# Patient Record
Sex: MALE | Race: WHITE | NOT HISPANIC OR LATINO | URBAN - METROPOLITAN AREA
[De-identification: names, ages, dates, MRNs, and addresses within clinical notes are randomized per-mention and may not be internally consistent; named-entity substitution may affect disease eponyms.]

---

## 2017-04-13 ENCOUNTER — EMERGENCY (EMERGENCY)
Facility: HOSPITAL | Age: 62
LOS: 1 days | End: 2017-04-13
Payer: COMMERCIAL

## 2017-04-13 PROCEDURE — 71020: CPT | Mod: 26

## 2017-04-13 PROCEDURE — 99285 EMERGENCY DEPT VISIT HI MDM: CPT

## 2017-04-14 ENCOUNTER — EMERGENCY (EMERGENCY)
Facility: HOSPITAL | Age: 62
LOS: 1 days | End: 2017-04-14
Payer: COMMERCIAL

## 2017-04-14 PROCEDURE — 71020: CPT | Mod: 26

## 2017-04-14 PROCEDURE — 99284 EMERGENCY DEPT VISIT MOD MDM: CPT

## 2019-09-03 ENCOUNTER — OUTPATIENT (OUTPATIENT)
Dept: OUTPATIENT SERVICES | Facility: HOSPITAL | Age: 64
LOS: 1 days | End: 2019-09-03

## 2020-03-12 ENCOUNTER — NON-APPOINTMENT (OUTPATIENT)
Age: 65
End: 2020-03-12

## 2020-03-12 ENCOUNTER — APPOINTMENT (OUTPATIENT)
Dept: CARDIOLOGY | Facility: CLINIC | Age: 65
End: 2020-03-12
Payer: COMMERCIAL

## 2020-03-12 VITALS
SYSTOLIC BLOOD PRESSURE: 120 MMHG | HEART RATE: 75 BPM | DIASTOLIC BLOOD PRESSURE: 78 MMHG | WEIGHT: 216 LBS | OXYGEN SATURATION: 98 % | HEIGHT: 71 IN | BODY MASS INDEX: 30.24 KG/M2

## 2020-03-12 DIAGNOSIS — Z82.49 FAMILY HISTORY OF ISCHEMIC HEART DISEASE AND OTHER DISEASES OF THE CIRCULATORY SYSTEM: ICD-10-CM

## 2020-03-12 DIAGNOSIS — Z78.9 OTHER SPECIFIED HEALTH STATUS: ICD-10-CM

## 2020-03-12 DIAGNOSIS — Z86.69 PERSONAL HISTORY OF OTHER DISEASES OF THE NERVOUS SYSTEM AND SENSE ORGANS: ICD-10-CM

## 2020-03-12 DIAGNOSIS — Z87.19 PERSONAL HISTORY OF OTHER DISEASES OF THE DIGESTIVE SYSTEM: ICD-10-CM

## 2020-03-12 PROCEDURE — 93000 ELECTROCARDIOGRAM COMPLETE: CPT

## 2020-03-12 PROCEDURE — 99203 OFFICE O/P NEW LOW 30 MIN: CPT

## 2020-03-12 NOTE — ASSESSMENT
[FreeTextEntry1] : Above EKG and blood work have been reviewed. \par Recommend echocardiogram, carotid Doppler, abdominal ultrasound and nuclear stress test.  \par Continue aspirin therapy.  \par Continue beta-blocker therapy.  \par Likely requires augmentation of lipid-lowering therapy.\par Clinical follow-up will be scheduled after noninvasive testing. \par \par

## 2020-03-12 NOTE — HISTORY OF PRESENT ILLNESS
[FreeTextEntry1] : TRISTA CONTEH  is a 64 year old  M\par Primary physician Dr. Basurto.  \par \par Longstanding history of hypertension and hyperlipidemia.  Family history of cardiovascular disease.  \par Multiple family members with cardiovascular disease in his 60s.  \par Previously underwent evaluation with Dr. Gary.  \par History of PVCs, valvular heart disease, abnormal stress test.  \par There is no prior history of a clinical myocardial infarction, coronary revascularization. \par There is no history of symptomatic congestive heart failure rheumatic heart disease.\par There is no history of symptomatic arrhythmias including atrial fibrillation.\par \par There is no exertional chest pain, pressure or discomfort. \par There is no significant dyspnea on exertion or orthopnea. \par There are no symptomatic palpitations, lightheadedness, dizziness or syncope.\par \par Last blood work March 2020 hemoglobin 13.9 total cholesterol 179, , creatinine 0.9.  \par Last echocardiogram in 2016 has normal left ventricular function mild to moderate valvular heart disease last nuclear stress test describes small inferoapical defect with concomitant attenuation.  Prior stress echocardiogram was equivocal abnormal EKG response\par \par EKG demonstrates normal sinus rhythm.

## 2020-03-12 NOTE — PHYSICAL EXAM
[General Appearance - Well Developed] : well developed [Normal Appearance] : normal appearance [Well Groomed] : well groomed [General Appearance - Well Nourished] : well nourished [No Deformities] : no deformities [General Appearance - In No Acute Distress] : no acute distress [Normal Conjunctiva] : the conjunctiva exhibited no abnormalities [Eyelids - No Xanthelasma] : the eyelids demonstrated no xanthelasmas [Normal Oral Mucosa] : normal oral mucosa [No Oral Pallor] : no oral pallor [No Oral Cyanosis] : no oral cyanosis [Normal Jugular Venous A Waves Present] : normal jugular venous A waves present [Normal Jugular Venous V Waves Present] : normal jugular venous V waves present [No Jugular Venous Corrales A Waves] : no jugular venous corrales A waves [Heart Rate And Rhythm] : heart rate and rhythm were normal [Heart Sounds] : normal S1 and S2 [Murmurs] : no murmurs present [Respiration, Rhythm And Depth] : normal respiratory rhythm and effort [Exaggerated Use Of Accessory Muscles For Inspiration] : no accessory muscle use [Auscultation Breath Sounds / Voice Sounds] : lungs were clear to auscultation bilaterally [Abdomen Soft] : soft [Abdomen Tenderness] : non-tender [Abdomen Mass (___ Cm)] : no abdominal mass palpated [Abnormal Walk] : normal gait [Gait - Sufficient For Exercise Testing] : the gait was sufficient for exercise testing [Nail Clubbing] : no clubbing of the fingernails [Cyanosis, Localized] : no localized cyanosis [Petechial Hemorrhages (___cm)] : no petechial hemorrhages [Skin Color & Pigmentation] : normal skin color and pigmentation [] : no rash [No Venous Stasis] : no venous stasis [Skin Lesions] : no skin lesions [No Skin Ulcers] : no skin ulcer [No Xanthoma] : no  xanthoma was observed [Oriented To Time, Place, And Person] : oriented to person, place, and time [Affect] : the affect was normal [Mood] : the mood was normal [No Anxiety] : not feeling anxious [FreeTextEntry1] : overweight

## 2020-03-12 NOTE — REASON FOR VISIT
[Consultation] : a consultation regarding [Hyperlipidemia] : hyperlipidemia [Hypertension] : hypertension [Medication Management] : Medication management

## 2020-06-25 ENCOUNTER — APPOINTMENT (OUTPATIENT)
Dept: CARDIOLOGY | Facility: CLINIC | Age: 65
End: 2020-06-25
Payer: MEDICARE

## 2020-06-25 ENCOUNTER — TRANSCRIPTION ENCOUNTER (OUTPATIENT)
Age: 65
End: 2020-06-25

## 2020-06-25 PROCEDURE — 93979 VASCULAR STUDY: CPT

## 2020-06-25 PROCEDURE — 78452 HT MUSCLE IMAGE SPECT MULT: CPT

## 2020-06-25 PROCEDURE — 93880 EXTRACRANIAL BILAT STUDY: CPT

## 2020-06-25 PROCEDURE — A9502: CPT

## 2020-06-25 PROCEDURE — 93306 TTE W/DOPPLER COMPLETE: CPT

## 2020-06-25 PROCEDURE — 93015 CV STRESS TEST SUPVJ I&R: CPT

## 2020-06-30 PROCEDURE — 93224 XTRNL ECG REC UP TO 48 HRS: CPT

## 2020-07-01 ENCOUNTER — APPOINTMENT (OUTPATIENT)
Dept: CARDIOLOGY | Facility: CLINIC | Age: 65
End: 2020-07-01
Payer: MEDICARE

## 2020-07-01 VITALS
HEART RATE: 68 BPM | DIASTOLIC BLOOD PRESSURE: 80 MMHG | WEIGHT: 208 LBS | TEMPERATURE: 98.3 F | HEIGHT: 71 IN | BODY MASS INDEX: 29.12 KG/M2 | OXYGEN SATURATION: 98 % | SYSTOLIC BLOOD PRESSURE: 124 MMHG

## 2020-07-01 PROCEDURE — 99214 OFFICE O/P EST MOD 30 MIN: CPT

## 2020-07-01 RX ORDER — METOPROLOL TARTRATE 50 MG/1
50 TABLET, FILM COATED ORAL
Refills: 0 | Status: ACTIVE | COMMUNITY

## 2020-07-01 RX ORDER — METOPROLOL SUCCINATE 50 MG/1
50 TABLET, EXTENDED RELEASE ORAL
Qty: 180 | Refills: 3 | Status: DISCONTINUED | COMMUNITY
End: 2020-07-01

## 2020-07-01 NOTE — HISTORY OF PRESENT ILLNESS
[FreeTextEntry1] : TRISTA CONTEH is a 65 year old male with a past medical history of \par \par Longstanding history of hypertension and hyperlipidemia.  Family history of cardiovascular disease.  \par Multiple family members with cardiovascular disease in his 60s.  \par Previously underwent evaluation with Dr. Gary.  \par History of PVCs, valvular heart disease, abnormal stress test.  \par There is no prior history of a clinical myocardial infarction, coronary revascularization. \par There is no history of symptomatic congestive heart failure rheumatic heart disease.\par There is no history of symptomatic arrhythmias including atrial fibrillation.\par \par Primary physician Dr. Basurto.  \par \par Last seen 3/12/20. Echo, carotid, abd, and stress testing ordered. Due to frequent ectopy on stress test, holter was ordered. He presents today to review results; see full details below. In the interim there have been no hospitalizations or procedures. He denies chest pain, pressure, unusual shortness of breath, orthopnea, LE edema, lightheadedness, dizziness, near syncope or syncope. Reports he feels his "PVCs" sometimes. Most a/w stress. He is exercising 1 hour daily on his bike without exertional complaints. Sometimes notes these "PVCs" while exercising.\par \par Testing:\par \par \par Holter 6/25/20: SR with rates ranging from  bpm. Average HR 69 bpm. PACs were rare with no sequentials noted. PVCs were rare to occasional with no couplets noted. Patient returned a diary with sx's of "occasional PVC" which corresponded to normal sinus with PVCs.\par \par Nuclear stress test 6/25/20: Aram Protocol 8 minutes and 7 seconds. Frequent PACs and PVCs. Ventricular couplets, runs of NSVT (longest 9 beats in duration; patient was off Metoprolol). EKG was positive for ischemia. There is a small, mild defect in inferior wall that is fixed c/w diaphragmatic attenuation artifact. The observed defects are no longer significant with prone imaging.\par \par Echo 5/25/20: EF 60-65%. Mild MR. Mild AR. no segmental wall motion abnormalities.\par \par Abd ultasound 6/25/20: Mild heterogeneous atherosclerosis seen throughout abd aorta. There is no evidence of abd aortic aneurysm.\par \par Carotids 6/25/20: Moderate plaque noted in the prox MANUELA. Remainder of the vessels have mild atherosclerotic dz. Anterior position of the ECA noted. BL elevated velocities in the ECA. Compared with exam 3/11/11, there has been progression of atherosclerotic dz.\par \par Last blood work March 2020 hemoglobin 13.9 total cholesterol 179, , creatinine 0.9.  \par Last echocardiogram in 2016 has normal left ventricular function mild to moderate valvular heart disease last nuclear stress test describes small inferoapical defect with concomitant attenuation.  Prior stress echocardiogram was equivocal abnormal EKG response\par \par EKG demonstrates normal sinus rhythm.

## 2020-07-01 NOTE — REASON FOR VISIT
[Consultation] : a consultation regarding [Medication Management] : Medication management [Hypertension] : hypertension [Hyperlipidemia] : hyperlipidemia

## 2020-07-01 NOTE — ASSESSMENT
[FreeTextEntry1] : TRISTA CONTEH is a 65 year old M who presents today Jul 01, 2020 with the above history and the following active issues: \par \par Significant family history with an abnormal stress test and frequent ectopy. Recommend CTA for further evaluation.\par \par  on statin therapy. Patient is compliant. Suspect familial hyperlipidemia. Consider increasing and/or adding agent at next visit once results of CTA are evaluated.\par \par \par Continue aspirin therapy.  \par Continue beta-blocker therapy.  \par Clinical follow-up will be scheduled after noninvasive testing. \par \par \par F/U after testing to review results.\par Discussed red flag symptoms, which would warrant sooner or emergent medical evaluation.\par Any questions and concerns were addressed and resolved.\par \par Sincerely,\par Mitzi Vanegas [facility]-BC\par Patient's history, testing, and plan was reviewed with supervising physician, Dr. Valentin Calixto\par

## 2020-07-01 NOTE — PHYSICAL EXAM
[General Appearance - Well Developed] : well developed [Normal Appearance] : normal appearance [Well Groomed] : well groomed [No Deformities] : no deformities [General Appearance - Well Nourished] : well nourished [General Appearance - In No Acute Distress] : no acute distress [Normal Conjunctiva] : the conjunctiva exhibited no abnormalities [Eyelids - No Xanthelasma] : the eyelids demonstrated no xanthelasmas [Normal Oral Mucosa] : normal oral mucosa [No Oral Cyanosis] : no oral cyanosis [No Oral Pallor] : no oral pallor [Normal Jugular Venous A Waves Present] : normal jugular venous A waves present [No Jugular Venous Corrales A Waves] : no jugular venous corrales A waves [Normal Jugular Venous V Waves Present] : normal jugular venous V waves present [Respiration, Rhythm And Depth] : normal respiratory rhythm and effort [Exaggerated Use Of Accessory Muscles For Inspiration] : no accessory muscle use [Heart Rate And Rhythm] : heart rate and rhythm were normal [Auscultation Breath Sounds / Voice Sounds] : lungs were clear to auscultation bilaterally [Abdomen Soft] : soft [Murmurs] : no murmurs present [Heart Sounds] : normal S1 and S2 [Abdomen Tenderness] : non-tender [Abdomen Mass (___ Cm)] : no abdominal mass palpated [Gait - Sufficient For Exercise Testing] : the gait was sufficient for exercise testing [Abnormal Walk] : normal gait [Petechial Hemorrhages (___cm)] : no petechial hemorrhages [Cyanosis, Localized] : no localized cyanosis [Nail Clubbing] : no clubbing of the fingernails [Skin Color & Pigmentation] : normal skin color and pigmentation [Skin Lesions] : no skin lesions [No Venous Stasis] : no venous stasis [] : no rash [No Skin Ulcers] : no skin ulcer [No Xanthoma] : no  xanthoma was observed [Oriented To Time, Place, And Person] : oriented to person, place, and time [No Anxiety] : not feeling anxious [Mood] : the mood was normal [Affect] : the affect was normal [FreeTextEntry1] : overweight

## 2020-07-01 NOTE — ADDENDUM
[FreeTextEntry1] : Please note the patient was seen and examined with NP Mitzi Vanegas.\par I was physically present during the service of the patient and personally examined the patient. \par I was directly involved in the management plan and recommendations of the care provided to the patient. \par I personally reviewed the history and physical examination as documented by the NP above.\par 07/01/2020\par \par Suspected familial hyperlipidemia.  Abnormal cardiovascular response exercise.  Concerned regarding CAD question balanced ischemia despite normal perfusion.  Cardiac CTA.

## 2020-07-09 ENCOUNTER — APPOINTMENT (OUTPATIENT)
Dept: CARDIOLOGY | Facility: CLINIC | Age: 65
End: 2020-07-09
Payer: MEDICARE

## 2020-07-09 VITALS
OXYGEN SATURATION: 98 % | DIASTOLIC BLOOD PRESSURE: 70 MMHG | WEIGHT: 208 LBS | HEIGHT: 71 IN | HEART RATE: 73 BPM | TEMPERATURE: 98.1 F | BODY MASS INDEX: 29.12 KG/M2 | SYSTOLIC BLOOD PRESSURE: 138 MMHG

## 2020-07-09 PROCEDURE — 99214 OFFICE O/P EST MOD 30 MIN: CPT

## 2020-07-10 RX ORDER — EZETIMIBE 10 MG/1
10 TABLET ORAL DAILY
Qty: 30 | Refills: 3 | Status: ACTIVE | COMMUNITY
Start: 2020-07-10 | End: 1900-01-01

## 2020-07-10 NOTE — PHYSICAL EXAM
[Normal Appearance] : normal appearance [General Appearance - Well Developed] : well developed [Well Groomed] : well groomed [General Appearance - Well Nourished] : well nourished [General Appearance - In No Acute Distress] : no acute distress [No Deformities] : no deformities [Eyelids - No Xanthelasma] : the eyelids demonstrated no xanthelasmas [Normal Conjunctiva] : the conjunctiva exhibited no abnormalities [No Oral Pallor] : no oral pallor [Normal Oral Mucosa] : normal oral mucosa [No Oral Cyanosis] : no oral cyanosis [Normal Jugular Venous A Waves Present] : normal jugular venous A waves present [No Jugular Venous Corrales A Waves] : no jugular venous corrales A waves [Normal Jugular Venous V Waves Present] : normal jugular venous V waves present [Respiration, Rhythm And Depth] : normal respiratory rhythm and effort [Exaggerated Use Of Accessory Muscles For Inspiration] : no accessory muscle use [Auscultation Breath Sounds / Voice Sounds] : lungs were clear to auscultation bilaterally [Heart Rate And Rhythm] : heart rate and rhythm were normal [Heart Sounds] : normal S1 and S2 [Murmurs] : no murmurs present [Abdomen Soft] : soft [Abdomen Tenderness] : non-tender [Abnormal Walk] : normal gait [Abdomen Mass (___ Cm)] : no abdominal mass palpated [Gait - Sufficient For Exercise Testing] : the gait was sufficient for exercise testing [Nail Clubbing] : no clubbing of the fingernails [Cyanosis, Localized] : no localized cyanosis [Petechial Hemorrhages (___cm)] : no petechial hemorrhages [Skin Color & Pigmentation] : normal skin color and pigmentation [] : no rash [No Venous Stasis] : no venous stasis [No Skin Ulcers] : no skin ulcer [Skin Lesions] : no skin lesions [Oriented To Time, Place, And Person] : oriented to person, place, and time [No Xanthoma] : no  xanthoma was observed [Mood] : the mood was normal [Affect] : the affect was normal [No Anxiety] : not feeling anxious [FreeTextEntry1] : overweight

## 2020-07-10 NOTE — HISTORY OF PRESENT ILLNESS
[FreeTextEntry1] : TRISTA CONTEH is a 65 year old male with a past medical history of \par \par Longstanding history of hypertension and hyperlipidemia.  Family history of cardiovascular disease.  \par Multiple family members with cardiovascular disease in his 60s.  \par Previously underwent evaluation with Dr. Gary.  \par History of PVCs, valvular heart disease, abnormal stress test.  \par There is no prior history of a clinical myocardial infarction, coronary revascularization. \par There is no history of symptomatic congestive heart failure rheumatic heart disease.\par There is no history of symptomatic arrhythmias including atrial fibrillation.\par \par Primary physician Dr. Basurto.  \par \par Last seen 7/1/20. CTA ordered due to abnormal EKG during stress testing and frequent PVCs with exercise. In the interim there have been no hospitalizations or procedures. She denies chest pain, pressure, palpitations, unusual shortness of breath, orthopnea, LE edema, lightheadedness, dizziness, near syncope or syncope. States his "PVCs" mostly occur with stress and have decreased. He presents today 7/9/20 to review the results of his CTA.\par \par Testing:\par \[facility] CTA 7/2/20: No active pulmonary disease. 5 mm granuloma is present in the right middle lobe. Atherosclerotic changes as described above with no hemodynamically significant stenoses identified. The patient's total calcium score is 232 with atherosclerotic calcifications distributed as follows: Left main = 0; LAD = 146; left circumflex = 3; RCA = 83.\par \[facility] \par Holter 6/25/20: SR with rates ranging from  bpm. Average HR 69 bpm. PACs were rare with no sequentials noted. PVCs were rare to occasional with no couplets noted. Patient returned a diary with sx's of "occasional PVC" which corresponded to normal sinus with PVCs.\par \par Nuclear stress test 6/25/20: Aram Protocol 8 minutes and 7 seconds. Frequent PACs and PVCs. Ventricular couplets, runs of NSVT (longest 9 beats in duration; patient was off Metoprolol). EKG was positive for ischemia. There is a small, mild defect in inferior wall that is fixed c/w diaphragmatic attenuation artifact. The observed defects are no longer significant with prone imaging.\par \par Echo 5/25/20: EF 60-65%. Mild MR. Mild AR. no segmental wall motion abnormalities.\par \par Abd ultasound 6/25/20: Mild heterogeneous atherosclerosis seen throughout abd aorta. There is no evidence of abd aortic aneurysm.\par \par Carotids 6/25/20: Moderate plaque noted in the prox MANUELA. Remainder of the vessels have mild atherosclerotic dz. Anterior position of the ECA noted. BL elevated velocities in the ECA. Compared with exam 3/11/11, there has been progression of atherosclerotic dz.\par \par Last blood work March 2020 hemoglobin 13.9 total cholesterol 179, , creatinine 0.9.  \par Last echocardiogram in 2016 has normal left ventricular function mild to moderate valvular heart disease last nuclear stress test describes small inferoapical defect with concomitant attenuation.  Prior stress echocardiogram was equivocal abnormal EKG response\par \par EKG demonstrates normal sinus rhythm.

## 2020-07-10 NOTE — ASSESSMENT
[FreeTextEntry1] : TRISTA CONTEH is a 65 year old M who presents today Jul 09, 2020 with the above history and the following active issues:\par \par CTA shows atherosclerotic changes as described above with no hemodynamically significant stenoses identified. . Incidental finding of 5 mm granuloma on right lobe. Recommend f/u with PCP. Total calcium score 232. Recommend increasing Lipitor to 80 mg daily as above. Labs to be checked in 2 months.\par \par Significant family history with an abnormal stress test and frequent ectopy. \par \par  on statin therapy. Patient is compliant. Suspect familial hyperlipidemia. Increasing Lipitor as above.\par \par \par Continue aspirin therapy.  \par Continue beta-blocker therapy.  \par Continue to monitor BP at home.\par \par \par F/U in 3-4 months.\par Discussed red flag symptoms, which would warrant sooner or emergent medical evaluation.\par Any questions and concerns were addressed and resolved.\par \par Sincerely,\par Mitzi Vanegas FNP-BC\par Patient's history, testing, and plan was reviewed with supervising physician, Dr. Patrick Valentino

## 2020-10-13 ENCOUNTER — APPOINTMENT (OUTPATIENT)
Dept: CARDIOLOGY | Facility: CLINIC | Age: 65
End: 2020-10-13
Payer: MEDICARE

## 2020-10-13 ENCOUNTER — NON-APPOINTMENT (OUTPATIENT)
Age: 65
End: 2020-10-13

## 2020-10-13 VITALS
BODY MASS INDEX: 28.42 KG/M2 | DIASTOLIC BLOOD PRESSURE: 80 MMHG | WEIGHT: 203 LBS | HEART RATE: 65 BPM | OXYGEN SATURATION: 98 % | SYSTOLIC BLOOD PRESSURE: 110 MMHG | HEIGHT: 71 IN

## 2020-10-13 PROCEDURE — 93000 ELECTROCARDIOGRAM COMPLETE: CPT

## 2020-10-13 PROCEDURE — 99214 OFFICE O/P EST MOD 30 MIN: CPT

## 2020-10-13 RX ORDER — ATORVASTATIN CALCIUM 40 MG/1
40 TABLET, FILM COATED ORAL
Qty: 90 | Refills: 3 | Status: DISCONTINUED | COMMUNITY
Start: 1900-01-01 | End: 2020-10-13

## 2020-10-13 NOTE — ASSESSMENT
[FreeTextEntry1] : TRISTA CONTEH is a 65 year old M who presents today Oct 13, 2020 with the above history and the following active issues:\par \par CTA shows atherosclerotic changes as described above with no hemodynamically significant stenoses identified. . Incidental finding of 5 mm granuloma on right lobe. Previously recommended f/u with PCP, however he is following with his son in law who is a pulmonologist. Total calcium score 232. LDL 87. Change Lipitor 40mg daily to Crestor 40mg daily. Fasting blood work in 2 months. (CMP, CK, Lipid panel, and LPa ordered.)\par \par Significant family history with an abnormal stress test and frequent ectopy. \par \par Suspect familial hyperlipidemia. \par \par \par Continue aspirin therapy.  \par Continue beta-blocker therapy.  \par Continue to monitor BP at home. Educated patient on low salt diet, alcohol intake in moderation, regular cardiovascular exercise, and weight reduction for improved BP control.\par Continue to monitor BP at home and call for persistently elevated readings (>130/90). \par \par Ongoing f/u with PCP.\par \par \par F/U in 6 months.\par Discussed red flag symptoms, which would warrant sooner or emergent medical evaluation.\par Any questions and concerns were addressed and resolved.\par \par Sincerely,\par Mitzi Vanegas [facility]-BC\par Patient's history, testing, and plan was reviewed with supervising physician, Dr. Valentin Calixto

## 2020-10-13 NOTE — HISTORY OF PRESENT ILLNESS
[FreeTextEntry1] : TRISTA CONTEH is a 65 year old male with a past medical history of \par \par Longstanding history of hypertension and hyperlipidemia.  Family history of cardiovascular disease.  \par Multiple family members with cardiovascular disease in his 60s.  \par Previously underwent evaluation with Dr. Gary.  \par History of PVCs, valvular heart disease, abnormal stress test.  \par There is no prior history of a clinical myocardial infarction, coronary revascularization. \par There is no history of symptomatic congestive heart failure rheumatic heart disease.\par There is no history of symptomatic arrhythmias including atrial fibrillation.\par \par Primary physician Dr. Basurto.  \par \par Last seen 7/9/20. In the interim there have been no hospitalizations or procedures. He denies chest pain, pressure, palpitations, unusual shortness of breath, orthopnea, LE edema, lightheadedness, dizziness, near syncope or syncope. Exercising routinely (bikes) up to 60 minutes without exertional complaints. Never a smoker.\par \par BP on my exam 142/92.\par \par Testing:\par \par EKG 10/13/20: SR at 60 bpm, MS interval 164 ms, QTc 390 ms, nonspecific ST-T wave abnormalities\par \par Labs 9/18/20: Cr 0.95, K 4, ALT 24, AST 28, Chol 155, Trigs 128, HDL 53, LDL 87, Mag 2, TSH 2.550, , A1C 5.8, Hgb 14.7\par \par CTA 7/2/20: No active pulmonary disease. 5 mm granuloma is present in the right middle lobe. Atherosclerotic changes as described above with no hemodynamically significant stenoses identified. The patient's total calcium score is 232 with atherosclerotic calcifications distributed as follows: Left main = 0; LAD = 146; left circumflex = 3; RCA = 83.\par \par \par Holter 6/25/20: SR with rates ranging from  bpm. Average HR 69 bpm. PACs were rare with no sequentials noted. PVCs were rare to occasional with no couplets noted. Patient returned a diary with sx's of "occasional PVC" which corresponded to normal sinus with PVCs.\par \par Nuclear stress test 6/25/20: Aram Protocol 8 minutes and 7 seconds. Frequent PACs and PVCs. Ventricular couplets, runs of NSVT (longest 9 beats in duration; patient was off Metoprolol). EKG was positive for ischemia. There is a small, mild defect in inferior wall that is fixed c/w diaphragmatic attenuation artifact. The observed defects are no longer significant with prone imaging.\par \par Echo 5/25/20: EF 60-65%. Mild MR. Mild AR. no segmental wall motion abnormalities.\par \par Abd ultasound 6/25/20: Mild heterogeneous atherosclerosis seen throughout abd aorta. There is no evidence of abd aortic aneurysm.\par \par Carotids 6/25/20: Moderate plaque noted in the prox MANUELA. Remainder of the vessels have mild atherosclerotic dz. Anterior position of the ECA noted. BL elevated velocities in the ECA. Compared with exam 3/11/11, there has been progression of atherosclerotic dz.\par \par Last blood work March 2020 hemoglobin 13.9 total cholesterol 179, , creatinine 0.9.  \par Last echocardiogram in 2016 has normal left ventricular function mild to moderate valvular heart disease last nuclear stress test describes small inferoapical defect with concomitant attenuation.  Prior stress echocardiogram was equivocal abnormal EKG response\par \par EKG demonstrates normal sinus rhythm.

## 2020-10-13 NOTE — PHYSICAL EXAM
[General Appearance - Well Developed] : well developed [Normal Appearance] : normal appearance [Well Groomed] : well groomed [General Appearance - Well Nourished] : well nourished [No Deformities] : no deformities [General Appearance - In No Acute Distress] : no acute distress [Normal Conjunctiva] : the conjunctiva exhibited no abnormalities [Eyelids - No Xanthelasma] : the eyelids demonstrated no xanthelasmas [Normal Oral Mucosa] : normal oral mucosa [No Oral Pallor] : no oral pallor [No Oral Cyanosis] : no oral cyanosis [Normal Jugular Venous A Waves Present] : normal jugular venous A waves present [Normal Jugular Venous V Waves Present] : normal jugular venous V waves present [No Jugular Venous Corrales A Waves] : no jugular venous corrales A waves [Respiration, Rhythm And Depth] : normal respiratory rhythm and effort [Exaggerated Use Of Accessory Muscles For Inspiration] : no accessory muscle use [Auscultation Breath Sounds / Voice Sounds] : lungs were clear to auscultation bilaterally [Heart Rate And Rhythm] : heart rate and rhythm were normal [Heart Sounds] : normal S1 and S2 [Murmurs] : no murmurs present [Abdomen Soft] : soft [Abdomen Tenderness] : non-tender [Abdomen Mass (___ Cm)] : no abdominal mass palpated [Abnormal Walk] : normal gait [Gait - Sufficient For Exercise Testing] : the gait was sufficient for exercise testing [Nail Clubbing] : no clubbing of the fingernails [Cyanosis, Localized] : no localized cyanosis [Petechial Hemorrhages (___cm)] : no petechial hemorrhages [Skin Color & Pigmentation] : normal skin color and pigmentation [] : no rash [No Venous Stasis] : no venous stasis [Skin Lesions] : no skin lesions [No Skin Ulcers] : no skin ulcer [No Xanthoma] : no  xanthoma was observed [Oriented To Time, Place, And Person] : oriented to person, place, and time [Affect] : the affect was normal [Mood] : the mood was normal [No Anxiety] : not feeling anxious [FreeTextEntry1] : overweight

## 2021-01-05 ENCOUNTER — FORM ENCOUNTER (OUTPATIENT)
Age: 66
End: 2021-01-05

## 2021-01-06 ENCOUNTER — TRANSCRIPTION ENCOUNTER (OUTPATIENT)
Age: 66
End: 2021-01-06

## 2021-01-10 ENCOUNTER — FORM ENCOUNTER (OUTPATIENT)
Age: 66
End: 2021-01-10

## 2021-01-12 ENCOUNTER — TRANSCRIPTION ENCOUNTER (OUTPATIENT)
Age: 66
End: 2021-01-12

## 2021-01-14 ENCOUNTER — RESULT CHARGE (OUTPATIENT)
Age: 66
End: 2021-01-14

## 2021-01-14 ENCOUNTER — APPOINTMENT (OUTPATIENT)
Dept: DISASTER EMERGENCY | Facility: HOSPITAL | Age: 66
End: 2021-01-14

## 2021-01-14 ENCOUNTER — OUTPATIENT (OUTPATIENT)
Dept: INPATIENT UNIT | Facility: HOSPITAL | Age: 66
LOS: 1 days | End: 2021-01-14
Payer: MEDICARE

## 2021-01-14 VITALS
OXYGEN SATURATION: 96 % | DIASTOLIC BLOOD PRESSURE: 70 MMHG | SYSTOLIC BLOOD PRESSURE: 136 MMHG | HEART RATE: 75 BPM | RESPIRATION RATE: 16 BRPM | TEMPERATURE: 98 F

## 2021-01-14 VITALS
TEMPERATURE: 97 F | OXYGEN SATURATION: 96 % | RESPIRATION RATE: 18 BRPM | SYSTOLIC BLOOD PRESSURE: 149 MMHG | HEIGHT: 71 IN | DIASTOLIC BLOOD PRESSURE: 86 MMHG | WEIGHT: 199.96 LBS | HEART RATE: 74 BPM

## 2021-01-14 DIAGNOSIS — U07.1 COVID-19: ICD-10-CM

## 2021-01-14 PROCEDURE — M0243: CPT

## 2021-01-14 NOTE — CHART NOTE - NSCHARTNOTEFT_GEN_A_CORE
Medicine NP note    CC: Monoclonal Antibody Infusion /COVID 19 Positive    64 y/o Male  with pmhx of HTN, HLD, Arthralgia, Gout, Dany' s esophagus, Nonsustained VT,  referred by Kelly Jackson presented for Monoclonal antibody treatment with Casirivimab and Imdevimab combination    Exam/findings:    PE:   Appearance:  A&ox3, calm and cooperative	  HEENT:   Normal oral mucosa,   Lymphatic: No lymphadenopathy  Cardiovascular: Normal S1 S2, No JVD, No murmurs, No edema  Respiratory: Lungs clear to auscultation	  Gastrointestinal:  Soft, Non-tender, + BS	  Skin: warm and dry  Neurologic: Non-focal  Extremities: Normal range of motion B/L U/L extremities    ASSESSMENT:    64 y/o Male  with pmhx of HTN, HLD, Arthralgia, Gout, Dany' s esophagus, Nonsustained VT,  referred by Kelly Jackson presented for Monoclonal antibody treatment with Casirivimab and Imdevimab combination    Risk Profile includes: Patient High risk for covid 19 progression in setting of Age, HTN    S/S: Cough, Fever, HA,  symptoms for 8-9 days    Covid 19 result positive on 01/06/2021    Triage note reviewed    Plan:   - infusion procedure explained to patient   -Consent for monoclonal antibody infusion obtained  - Risk & benefits discussed/all questions answered  -infuse  Casirivimab and IMdevimab 1200mg/1200mg (total of 2400mg) IV over one hour   -observe patient for one hour post infusion    I have reviewed the Casirivimab and IMdevimab combination. Emergency Use Authorization (EUA) and I have provided the patient or patient's caregiver with the following information:    1. FDA has authorized emergency use of Casirivimab and IMdevimab combination., which is not an FDA-approved biological product.  2. The patient or patient's caregiver has the option to accept or refuse administration of Casirivimab and IMdevimab combination.   3. The significant known and potential risks and benefits of Casirivimab and IMdevimab combination. and the extent to which such risks and benefits are unknown.  4. Information on available alternative treatments and risks and benefits of those alternatives.    Discharge:     Patient tolerated infusion well, denies complaints of chest pain /SOB/dizziness/ palpitations  VSS stable for D/C home  D/C instructions given /fact sheet included  Patient to follow up with PMD as needed.    Stephanie Mon [facility]  Department of Medicine  [phone]. Medicine NP note    CC: Monoclonal Antibody Infusion /COVID 19 Positive    66 y/o Male  with pmhx of HTN, HLD, Arthralgia, Gout, Dany' s esophagus, Nonsustained VT,  referred by Kelly Jackson presented for Monoclonal antibody treatment with Casirivimab and Imdevimab combination  Patient symptomatic with fever, HA.    Exam/findings:    PE:   Appearance:  A&ox3, calm and cooperative	  HEENT:   Normal oral mucosa,   Lymphatic: No lymphadenopathy  Cardiovascular: Normal S1 S2, No JVD, No murmurs, No edema  Respiratory: Lungs clear to auscultation	  Gastrointestinal:  Soft, Non-tender, + BS	  Skin: warm and dry  Neurologic: Non-focal  Extremities: Normal range of motion B/L U/L extremities    ASSESSMENT:    66 y/o Male  with pmhx of HTN, HLD, Arthralgia, Gout, Dany' s esophagus, Nonsustained VT,  referred by Kelly Jackson presented for Monoclonal antibody treatment with Casirivimab and Imdevimab combination    Risk Profile includes: Patient High risk for covid 19 progression in setting of Age, HTN    S/S: Cough, Fever, HA, symptoms for 8-9 days    Covid 19 result positive on 01/06/2021    Triage note reviewed    Plan:   - infusion procedure explained to patient   -Consent for monoclonal antibody infusion obtained  - Risk & benefits discussed/all questions answered  -infuse  Casirivimab and IMdevimab 1200mg/1200mg (total of 2400mg) IV over one hour   -observe patient for one hour post infusion    I have reviewed the Casirivimab and IMdevimab combination. Emergency Use Authorization (EUA) and I have provided the patient or patient's caregiver with the following information:    1. FDA has authorized emergency use of Casirivimab and IMdevimab combination., which is not an FDA-approved biological product.  2. The patient or patient's caregiver has the option to accept or refuse administration of Casirivimab and IMdevimab combination.   3. The significant known and potential risks and benefits of Casirivimab and IMdevimab combination. and the extent to which such risks and benefits are unknown.  4. Information on available alternative treatments and risks and benefits of those alternatives.    Discharge:     Patient tolerated infusion well, denies complaints of chest pain /SOB/dizziness/ palpitations  VSS stable for D/C home  D/C instructions given /fact sheet included  Patient to follow up with PMD as needed.    Stephanie Mon [facility]  Department of Medicine  [phone]. Medicine NP note    CC: Monoclonal Antibody Infusion /COVID 19 Positive    64 y/o Male  with pmhx of HTN, HLD, Arthralgia, Gout, Dany' s esophagus, Nonsustained VT,  referred by Kelly Jackson presented for Monoclonal antibody treatment with Casirivimab and Imdevimab combination  Patient symptomatic with fever, HA.    Exam/findings:    Vital Signs Last 24 Hrs  T(C): 36.8 (14 Jan 2021 14:01), Max: 37.2 (14 Jan 2021 12:53)  T(F): 98.3 (14 Jan 2021 14:01), Max: 98.9 (14 Jan 2021 12:53)  HR: 75 (14 Jan 2021 14:01) (74 - 80)  BP: 136/70 (14 Jan 2021 14:01) (136/70 - 149/86)  BP(mean): --  RR: 16 (14 Jan 2021 14:01) (16 - 18)  SpO2: 96% (14 Jan 2021 14:01) (95% - 96%)    PE:   Appearance:  A&ox3, calm and cooperative	  HEENT:   Normal oral mucosa,   Lymphatic: No lymphadenopathy  Cardiovascular: Normal S1 S2, No JVD, No murmurs, No edema  Respiratory: Lungs clear to auscultation	  Gastrointestinal:  Soft, Non-tender, + BS	  Skin: warm and dry  Neurologic: Non-focal  Extremities: Normal range of motion B/L U/L extremities    ASSESSMENT:    64 y/o Male  with pmhx of HTN, HLD, Arthralgia, Gout, Dany' s esophagus, Nonsustained VT,  referred by Kelly Jackson presented for Monoclonal antibody treatment with Casirivimab and Imdevimab combination    Risk Profile includes: Patient High risk for covid 19 progression in setting of Age, HTN    S/S: Cough, Fever, HA, symptoms for 8-9 days    Covid 19 result positive on 01/06/2021    Triage note reviewed    Plan:   - infusion procedure explained to patient   -Consent for monoclonal antibody infusion obtained  - Risk & benefits discussed/all questions answered  -infuse  Casirivimab and IMdevimab 1200mg/1200mg (total of 2400mg) IV over one hour   -observe patient for one hour post infusion    I have reviewed the Casirivimab and IMdevimab combination. Emergency Use Authorization (EUA) and I have provided the patient or patient's caregiver with the following information:    1. FDA has authorized emergency use of Casirivimab and IMdevimab combination., which is not an FDA-approved biological product.  2. The patient or patient's caregiver has the option to accept or refuse administration of Casirivimab and IMdevimab combination.   3. The significant known and potential risks and benefits of Casirivimab and IMdevimab combination. and the extent to which such risks and benefits are unknown.  4. Information on available alternative treatments and risks and benefits of those alternatives.    Discharge: 14:01 PM    Patient tolerated infusion well, denies complaints of chest pain /SOB/dizziness/ palpitations  VSS stable for D/C home  D/C instructions given /fact sheet included  Patient to follow up with PMD as needed.    Stephanie Mon [facility]  Department of Medicine  [phone].

## 2021-01-15 ENCOUNTER — INPATIENT (INPATIENT)
Facility: HOSPITAL | Age: 66
LOS: 1 days | Discharge: HOME | End: 2021-01-17
Payer: MEDICARE

## 2021-01-15 ENCOUNTER — TRANSCRIPTION ENCOUNTER (OUTPATIENT)
Age: 66
End: 2021-01-15

## 2021-01-15 PROCEDURE — 93010 ELECTROCARDIOGRAM REPORT: CPT

## 2021-01-15 PROCEDURE — 71045 X-RAY EXAM CHEST 1 VIEW: CPT | Mod: 26

## 2021-01-15 PROCEDURE — 99285 EMERGENCY DEPT VISIT HI MDM: CPT | Mod: CS

## 2021-01-16 ENCOUNTER — TRANSCRIPTION ENCOUNTER (OUTPATIENT)
Age: 66
End: 2021-01-16

## 2021-01-16 ENCOUNTER — OUTPATIENT (OUTPATIENT)
Dept: OUTPATIENT SERVICES | Facility: HOSPITAL | Age: 66
LOS: 1 days | End: 2021-01-16

## 2021-01-17 ENCOUNTER — OUTPATIENT (OUTPATIENT)
Dept: OUTPATIENT SERVICES | Facility: HOSPITAL | Age: 66
LOS: 1 days | End: 2021-01-17

## 2021-01-17 PROCEDURE — 71250 CT THORAX DX C-: CPT | Mod: 26

## 2021-01-19 ENCOUNTER — TRANSCRIPTION ENCOUNTER (OUTPATIENT)
Age: 66
End: 2021-01-19

## 2021-01-20 ENCOUNTER — TRANSCRIPTION ENCOUNTER (OUTPATIENT)
Age: 66
End: 2021-01-20

## 2021-01-21 ENCOUNTER — TRANSCRIPTION ENCOUNTER (OUTPATIENT)
Age: 66
End: 2021-01-21

## 2021-02-17 DIAGNOSIS — U07.1 COVID-19: ICD-10-CM

## 2021-02-24 ENCOUNTER — APPOINTMENT (OUTPATIENT)
Dept: CARDIOLOGY | Facility: CLINIC | Age: 66
End: 2021-02-24
Payer: MEDICARE

## 2021-02-24 PROCEDURE — 93306 TTE W/DOPPLER COMPLETE: CPT

## 2021-03-01 ENCOUNTER — APPOINTMENT (OUTPATIENT)
Dept: CARDIOLOGY | Facility: CLINIC | Age: 66
End: 2021-03-01
Payer: MEDICARE

## 2021-03-01 PROCEDURE — 99214 OFFICE O/P EST MOD 30 MIN: CPT

## 2021-03-01 RX ORDER — PREDNISONE 10 MG/1
10 TABLET ORAL
Qty: 10 | Refills: 0 | Status: DISCONTINUED | COMMUNITY
Start: 2021-01-17

## 2021-03-01 RX ORDER — ROSUVASTATIN CALCIUM 40 MG/1
40 TABLET, FILM COATED ORAL
Qty: 90 | Refills: 3 | Status: ACTIVE | COMMUNITY
Start: 2020-10-13 | End: 1900-01-01

## 2021-03-01 RX ORDER — ALBUTEROL SULFATE 90 UG/1
108 (90 BASE) INHALANT RESPIRATORY (INHALATION)
Qty: 18 | Refills: 0 | Status: DISCONTINUED | COMMUNITY
Start: 2021-01-11

## 2021-03-01 RX ORDER — FLUTICASONE PROPIONATE 50 UG/1
50 SPRAY, METERED NASAL
Qty: 16 | Refills: 0 | Status: DISCONTINUED | COMMUNITY
Start: 2021-01-11

## 2021-03-01 RX ORDER — PREDNISONE 20 MG/1
20 TABLET ORAL
Qty: 18 | Refills: 0 | Status: DISCONTINUED | COMMUNITY
Start: 2021-01-19

## 2021-03-03 ENCOUNTER — NON-APPOINTMENT (OUTPATIENT)
Age: 66
End: 2021-03-03

## 2021-03-03 NOTE — ASSESSMENT
[FreeTextEntry1] : Significant family history \par CAC. \par Suspect familial hyperlipidemia. \par HTN\par Mild VHD\par Asc\par Ectopy\par \par echocardiogram results have been reviewed\par Follow-up carotid Doppler, labs \par \par Continue aspirin and high intensity statin rx  \par Continue beta-blocker therapy.  \par Educated patient on low salt diet, alcohol intake in moderation, regular cardiovascular exercise, and weight reduction for improved BP control.\par monitor BP at home and call for persistently elevated readings (>130/90). \par Discussed red flag symptoms, which would warrant sooner or emergent medical evaluation.\par Any questions and concerns were addressed and resolved.\par

## 2021-03-03 NOTE — HISTORY OF PRESENT ILLNESS
[FreeTextEntry1] : Primary physician Dr. Basurto.  \par \par TRISTA CONTEH is a 65 year old male with a past medical history of \par \par Longstanding history of hypertension and hyperlipidemia.  Family history of cardiovascular disease.  \par Multiple family members with cardiovascular disease in his 60s.  \par Previously underwent evaluation with Dr. Gary.  \par History of PVCs, valvular heart disease, abnormal stress test.  \par There is no prior history of a clinical myocardial infarction, coronary revascularization. \par There is no history of symptomatic congestive heart failure rheumatic heart disease.\par There is no history of symptomatic arrhythmias including atrial fibrillation.\par \par In the interim he had Covid.  He was admitted to [facility] for several days. \par An echocardiogram had been recommended to rule out Covid cardiomyopathy.  \par He recently had blood work with his primary physician. \par He is less active since viral infection.  \par Plans to follow-up with pulmonologist.  \par Echocardiogram demonstrates normal left ventricular function mild MR mild AI mild pulmonary hypertension \par \par EKG 10/13/20: SR at 60 bpm, MT interval 164 ms, QTc 390 ms, nonspecific ST-T wave abnormalities\par Labs 9/18/20: Cr 0.95, K 4, ALT 24, AST 28, Chol 155, Trigs 128, HDL 53, LDL 87, Mag 2, TSH 2.550, , A1C 5.8, Hgb 14.7\par CTA 7/2/20: No active pulmonary disease. 5 mm granuloma is present in the right middle lobe. Atherosclerotic changes as described above with no hemodynamically significant stenoses identified. The patient's total calcium score is 232 with atherosclerotic calcifications distributed as follows: Left main = 0; LAD = 146; left circumflex = 3; RCA = 83.\par Holter 6/25/20: SR with rates ranging from  bpm. Average HR 69 bpm. PACs were rare with no sequentials noted. PVCs were rare to occasional with no couplets noted. Patient returned a diary with sx's of "occasional PVC" which corresponded to normal sinus with PVCs.\par Nuclear stress test 6/25/20: Aram Protocol 8 minutes and 7 seconds. Frequent PACs and PVCs. Ventricular couplets, runs of NSVT (longest 9 beats in duration; patient was off Metoprolol). EKG was positive for ischemia. There is a small, mild defect in inferior wall that is fixed c/w diaphragmatic attenuation artifact. The observed defects are no longer significant with prone imaging.\par Echo 5/25/20: EF 60-65%. Mild MR. Mild AR. no segmental wall motion abnormalities.\par Abd ultasound 6/25/20: Mild heterogeneous atherosclerosis seen throughout abd aorta. There is no evidence of abd aortic aneurysm.\par Carotids 6/25/20: Moderate plaque noted in the prox MANUELA. Remainder of the vessels have mild atherosclerotic dz. Anterior position of the ECA noted. BL elevated velocities in the ECA. Compared with exam 3/11/11, there has been progression of atherosclerotic dz.

## 2021-03-03 NOTE — ADDENDUM
[FreeTextEntry1] : Recent labs with creatinine of 0.8 total cholesterol 140 LDL 69 LP(a) is elevated.  Will optimize lipid-lowering therapy.  Evaluate for clinical trial.  Possible addition of PCSK9 inhibitor\par

## 2021-04-16 ENCOUNTER — APPOINTMENT (OUTPATIENT)
Age: 66
End: 2021-04-16

## 2021-09-03 ENCOUNTER — EMERGENCY (EMERGENCY)
Facility: HOSPITAL | Age: 66
LOS: 1 days | End: 2021-09-03
Admitting: EMERGENCY MEDICINE
Payer: MEDICARE

## 2021-09-03 PROCEDURE — 70450 CT HEAD/BRAIN W/O DYE: CPT | Mod: 26

## 2021-09-03 PROCEDURE — 70486 CT MAXILLOFACIAL W/O DYE: CPT | Mod: 26

## 2021-09-03 PROCEDURE — 73564 X-RAY EXAM KNEE 4 OR MORE: CPT | Mod: 26,LT

## 2021-09-03 PROCEDURE — 99284 EMERGENCY DEPT VISIT MOD MDM: CPT

## 2021-09-03 PROCEDURE — 72125 CT NECK SPINE W/O DYE: CPT | Mod: 26

## 2021-09-10 ENCOUNTER — APPOINTMENT (OUTPATIENT)
Dept: CARDIOLOGY | Facility: CLINIC | Age: 66
End: 2021-09-10
Payer: MEDICARE

## 2021-09-10 VITALS
OXYGEN SATURATION: 99 % | WEIGHT: 204 LBS | HEIGHT: 71 IN | HEART RATE: 69 BPM | DIASTOLIC BLOOD PRESSURE: 70 MMHG | TEMPERATURE: 97.7 F | BODY MASS INDEX: 28.56 KG/M2 | SYSTOLIC BLOOD PRESSURE: 118 MMHG

## 2021-09-10 DIAGNOSIS — U07.1 COVID-19: ICD-10-CM

## 2021-09-10 PROCEDURE — 99214 OFFICE O/P EST MOD 30 MIN: CPT | Mod: CS

## 2021-09-10 NOTE — REASON FOR VISIT
[Arrhythmia/ECG Abnorrmalities] : arrhythmia/ECG abnormalities [Structural Heart and Valve Disease] : structural heart and valve disease [Hyperlipidemia] : hyperlipidemia [Hypertension] : hypertension [Coronary Artery Disease] : coronary artery disease [Carotid, Aortic and Peripheral Vascular Disease] : carotid, aortic and peripheral vascular disease

## 2021-09-19 NOTE — ASSESSMENT
[FreeTextEntry1] : follow-up carotid and monitor \par continue aspirin lipid-lowering therapy and beta-blocker\par \par Significant family history \par CAC. \par Suspect familial hyperlipidemia.  +Lpa\par HTN\par Mild VHD\par Asc\par Ectopy\par Will optimize lipid-lowering therapy.  Evaluate for clinical trial.  Possible addition of PCSK9 inhibitor\par \par Educated patient on low salt diet, alcohol intake in moderation, regular cardiovascular exercise, and weight reduction for improved BP control.\par monitor BP at home and call for persistently elevated readings (>130/90). \par Discussed red flag symptoms, which would warrant sooner or emergent medical evaluation.\par Any questions and concerns were addressed and resolved.\par

## 2021-09-29 ENCOUNTER — APPOINTMENT (OUTPATIENT)
Dept: CARDIOLOGY | Facility: CLINIC | Age: 66
End: 2021-09-29
Payer: MEDICARE

## 2021-09-29 PROCEDURE — 93880 EXTRACRANIAL BILAT STUDY: CPT

## 2021-10-01 PROCEDURE — 93224 XTRNL ECG REC UP TO 48 HRS: CPT

## 2021-10-15 ENCOUNTER — NON-APPOINTMENT (OUTPATIENT)
Age: 66
End: 2021-10-15

## 2021-10-15 ENCOUNTER — APPOINTMENT (OUTPATIENT)
Dept: CARDIOLOGY | Facility: CLINIC | Age: 66
End: 2021-10-15
Payer: MEDICARE

## 2021-10-15 VITALS
WEIGHT: 205 LBS | HEART RATE: 63 BPM | OXYGEN SATURATION: 98 % | BODY MASS INDEX: 28.7 KG/M2 | SYSTOLIC BLOOD PRESSURE: 134 MMHG | TEMPERATURE: 97.6 F | HEIGHT: 71 IN | DIASTOLIC BLOOD PRESSURE: 76 MMHG

## 2021-10-15 PROCEDURE — 93000 ELECTROCARDIOGRAM COMPLETE: CPT

## 2021-10-15 PROCEDURE — 99214 OFFICE O/P EST MOD 30 MIN: CPT

## 2021-10-15 NOTE — ASSESSMENT
[FreeTextEntry1] : TRISTA CONTEH is a 66 year old M who presents today Oct 15, 2021 here to review cardiovascular test results. \par \par Significant family history \par CAC. Carotid atherosclerosis.\par Suspect familial hyperlipidemia.  +Lpa\par Extremely active without symptoms. Normal EF. Rec continued optimal medical therpy. \par Cont ASA/statin/BB. Call for new symptoms for further eval. \par \par HTN\par Well controlled, cont metoprolol current dosing. \par Educated patient on low salt diet, alcohol intake in moderation, regular cardiovascular exercise, and weight reduction for improved BP control.\par monitor BP at home and call for persistently elevated readings (>130/90). \par \par Mild VHD, normal EF. Surveillance monitoring advised. \par \par Ectopy, rare PVCs on recent monitor reviewed well controlled on metoprolol tart 50mg BID. Normal EF. Cont beta blocker and reduce stimulant intake. \par \par HLD\par Reviewed dietary changes to optimize lipid-lowering therapy.  Evaluate for clinical trial.  Possible addition of PCSK9 inhibitor in the future. At this time LDL at goal <70 on crestor and zetia. \par .\par Any questions and concerns were addressed and resolved.\par \par Sincerely,\par \par LORRAINE Belle\par Patients history, testing, and plan reviewed with supervising MD: Dr. Valentin Calixto \par

## 2021-10-15 NOTE — HISTORY OF PRESENT ILLNESS
[FreeTextEntry1] : TRISTA CONTEH  is a 66 year old  M here to review cardiovascular test results. \par \par Longstanding history of hypertension and hyperlipidemia.  Family history of cardiovascular disease.  \par Multiple family members with cardiovascular disease in his 60s.  Elevated Lp(a). \par Previously underwent evaluation with Dr. Gary.  \par History of PVCs, valvular heart disease, abnormal stress test.  \par CAC by CT. \par \par There is no prior history of a clinical myocardial infarction, coronary revascularization. \par There is no history of symptomatic congestive heart failure rheumatic heart disease\par There is no history of symptomatic arrhythmias including atrial fibrillation.\par \par s/p Covid early '21.  He was admitted to [facility] for several days. \par he bikes up to 60 minutes without symptoms\par \par EKG today 10/15/21 normal sinus rhythm \par Holter monitor 9/29/21 normal sinus rhythm with avg 63 bpm. rare APCs and PVCs. \par Carotid US 9/29/21 mild nonobx atherosclerosis BL\par \par Echocardiogram 2/2021 demonstrates normal left ventricular function mild MR mild AI mild pulmonary hypertension  \par \par Last blood work Jun '21 creatinine 0.9 total cholesterol 148 LDL 69 TSH 3.7 hemoglobin 13.2 \par Feb '21 Lp(a) >500\par \par CTA 7/2/20: No active pulmonary disease. 5 mm granuloma is present in the right middle lobe. Atherosclerotic changes as described above with no hemodynamically significant stenoses identified. The patient's total calcium score is 232 with atherosclerotic calcifications distributed as follows: Left main = 0; LAD = 146; left circumflex = 3; RCA = 83.\par \par Holter 6/25/20: SR with rates ranging from  bpm. Average HR 69 bpm. PACs were rare with no sequentials noted. PVCs were rare to occasional with no couplets noted. Patient returned a diary with sx's of "occasional PVC" which corresponded to normal sinus with PVCs.\par \par Nuclear stress test 6/25/20: Aram Protocol 8 minutes and 7 seconds. Frequent PACs and PVCs. Ventricular couplets, runs of NSVT (longest 9 beats in duration; patient was off Metoprolol). EKG was positive for ischemia. There is a small, mild defect in inferior wall that is fixed c/w diaphragmatic attenuation artifact. The observed defects are no longer significant with prone imaging.\par \par Abd ultasound 6/25/20: Mild heterogeneous atherosclerosis seen throughout abd aorta. There is no evidence of abd aortic aneurysm.\par \par

## 2021-10-15 NOTE — ADDENDUM
[FreeTextEntry1] : Please note the patient was reviewed with NP Alanis Mendoza.\magalys I was physically present during the service of the patient.\magalys I was directly involved in the management plan and recommendations of the care provided to the patient. \maglays I personally reviewed the history and physical examination as documented by the NP above.\par Oct 15 2021  9:30AM\par

## 2021-12-22 NOTE — HISTORY OF PRESENT ILLNESS
[FreeTextEntry1] : TRISTA CONTEH  is a 66 year old  M\par \par Longstanding history of hypertension and hyperlipidemia.  Family history of cardiovascular disease.  \par Multiple family members with cardiovascular disease in his 60s.  \par Previously underwent evaluation with Dr. Gary.  \par History of PVCs, valvular heart disease, abnormal stress test.  \par There is no prior history of a clinical myocardial infarction, coronary revascularization. \par There is no history of symptomatic congestive heart failure rheumatic heart disease.\par There is no history of symptomatic arrhythmias including atrial fibrillation.\par \par s/p Covid.  He was admitted to [facility] for several days. \par he bikes up to 60 minutes \par \par Echocardiogram demonstrates normal left ventricular function mild MR mild AI mild pulmonary hypertension \par Last blood work creatinine 0.9 total cholesterol 148 LDL 69 TSH 3.7 hemoglobin 13.2 \par EKG 10/13/20: SR at 60 bpm, MI interval 164 ms, QTc 390 ms, nonspecific ST-T wave abnormalities\par CTA 7/2/20: No active pulmonary disease. 5 mm granuloma is present in the right middle lobe. Atherosclerotic changes as described above with no hemodynamically significant stenoses identified. The patient's total calcium score is 232 with atherosclerotic calcifications distributed as follows: Left main = 0; LAD = 146; left circumflex = 3; RCA = 83.\par Holter 6/25/20: SR with rates ranging from  bpm. Average HR 69 bpm. PACs were rare with no sequentials noted. PVCs were rare to occasional with no couplets noted. Patient returned a diary with sx's of "occasional PVC" which corresponded to normal sinus with PVCs.\par Nuclear stress test 6/25/20: Aram Protocol 8 minutes and 7 seconds. Frequent PACs and PVCs. Ventricular couplets, runs of NSVT (longest 9 beats in duration; patient was off Metoprolol). EKG was positive for ischemia. There is a small, mild defect in inferior wall that is fixed c/w diaphragmatic attenuation artifact. The observed defects are no longer significant with prone imaging.\par Abd ultasound 6/25/20: Mild heterogeneous atherosclerosis seen throughout abd aorta. There is no evidence of abd aortic aneurysm.\par Carotids 6/25/20: Moderate plaque noted in the prox MANUELA. Remainder of the vessels have mild atherosclerotic dz. Anterior position of the ECA noted. BL elevated velocities in the ECA. Compared with exam 3/11/11, there has been progression of atherosclerotic dz.\par \par  No restrictions

## 2022-02-06 ENCOUNTER — RESULT CHARGE (OUTPATIENT)
Age: 67
End: 2022-02-06

## 2022-02-07 ENCOUNTER — APPOINTMENT (OUTPATIENT)
Dept: CARDIOLOGY | Facility: CLINIC | Age: 67
End: 2022-02-07
Payer: MEDICARE

## 2022-02-07 ENCOUNTER — NON-APPOINTMENT (OUTPATIENT)
Age: 67
End: 2022-02-07

## 2022-02-07 VITALS
SYSTOLIC BLOOD PRESSURE: 138 MMHG | WEIGHT: 208 LBS | OXYGEN SATURATION: 100 % | BODY MASS INDEX: 29.12 KG/M2 | DIASTOLIC BLOOD PRESSURE: 84 MMHG | HEART RATE: 65 BPM | TEMPERATURE: 97.7 F | HEIGHT: 71 IN

## 2022-02-07 DIAGNOSIS — Z01.810 ENCOUNTER FOR PREPROCEDURAL CARDIOVASCULAR EXAMINATION: ICD-10-CM

## 2022-02-07 DIAGNOSIS — I47.2 VENTRICULAR TACHYCARDIA: ICD-10-CM

## 2022-02-07 PROCEDURE — 93000 ELECTROCARDIOGRAM COMPLETE: CPT

## 2022-02-07 PROCEDURE — 99214 OFFICE O/P EST MOD 30 MIN: CPT

## 2022-02-07 NOTE — HISTORY OF PRESENT ILLNESS
[FreeTextEntry1] : TRISTA CONTEH  is a 66 year old  M \par \par Longstanding history of hypertension and hyperlipidemia.  Family history of cardiovascular disease.  \par Multiple family members with cardiovascular disease in his 60s.  Elevated Lp(a). \par Previously underwent evaluation with Dr. Gary.  \par History of PVCs, valvular heart disease, abnormal stress test.  \par CAC by CT. \par \par There is no prior history of a clinical myocardial infarction, coronary revascularization. \par There is no history of symptomatic congestive heart failure rheumatic heart disease\par There is no history of symptomatic arrhythmias including atrial fibrillation.\par \par s/p Covid early '21.  He was admitted to [facility] for several days. \par he bikes up to 60 minutes without symptoms\par \par EKG today 2/7/21 normal sinus rhythm with no change noted. \par Holter monitor 9/29/21 normal sinus rhythm with avg 63 bpm. rare APCs and PVCs. \par Carotid US 9/29/21 mild nonobx atherosclerosis BL\par Echocardiogram 2/2021 demonstrates normal left ventricular function, mild MR, mild AI, mild pulmonary hypertension. \par Labs 10/19/21 creat 0.97, k 5, LDL 70, HDL 52, trig 129\par Feb '21 Lp(a) >500\par \par CTA 7/2/20: No active pulmonary disease. 5 mm granuloma is present in the right middle lobe. Atherosclerotic changes as described above with no hemodynamically significant stenoses identified. The patient's total calcium score is 232 with atherosclerotic calcifications distributed as follows: Left main = 0; LAD = 146; left circumflex = 3; RCA = 83.\par \par Holter 6/25/20: SR with rates ranging from  bpm. Average HR 69 bpm. PACs were rare with no sequentials noted. PVCs were rare to occasional with no couplets noted. Patient returned a diary with sx's of "occasional PVC" which corresponded to normal sinus with PVCs.\par \par Nuclear stress test 6/25/20: Aram Protocol 8 minutes and 7 seconds. Frequent PACs and PVCs. Ventricular couplets, runs of NSVT (longest 9 beats in duration; patient was off Metoprolol). EKG was positive for ischemia. There is a small, mild defect in inferior wall that is fixed c/w diaphragmatic attenuation artifact. The observed defects are no longer significant with prone imaging.\par \par Abd ultasound 6/25/20: Mild heterogeneous atherosclerosis seen throughout abd aorta. There is no evidence of abd aortic aneurysm.\par

## 2022-02-07 NOTE — REASON FOR VISIT
[FreeTextEntry1] : \par preop cardiac clearance for endoscopy Dr. Lopez 2/18/21 at [facility] \par

## 2022-02-07 NOTE — ASSESSMENT
[FreeTextEntry1] : TRISTA CONTEH is a 66 year old M who presents today Oct 15, 2021 \par preop cardiac clearance for endoscopy Dr. Lopez 2/18/21 at [facility] \par \par At present, there are no active cardiac conditions. \par No recent unstable coronary syndromes, decompensated heart failure, severe valvular heart disease or significant dysrhythmias.  \par Baseline functional status is excellent.    \par The clinical benefit of the proposed procedure outweighs the associated cardiovascular risk.  \par Risk not attenuated with further CV testing.  \par Prior testing as outlined above.\par Optimized from a cardiovascular perspective.\par Minimize time off ASA, may hold preop 5-7 days prior if needed an restart as soon as hemostasis achieved following procedure. \par Continue beta blocker perioperatively. \par DVT ppx per protocol \par \par Significant family history CAD. \par CAC. Carotid atherosclerosis.\par Suspect familial hyperlipidemia.  +Lpa\par Extremely active without symptoms. Normal EF. Rec continued optimal medical therapy. \par Cont ASA/statin/BB. Call for new symptoms for further eval. \par \par HTN\par typically well controlled, borderline today, cont metoprolol current dosing. \par Educated patient on low salt diet, alcohol intake in moderation, regular cardiovascular exercise, and weight reduction for improved BP control.\par monitor BP at home and call for persistently elevated readings (>130/90). \par \par Mild VHD, normal EF. Surveillance monitoring advised. \par \par Ectopy, rare PVCs on recent monitor reviewed well controlled on metoprolol tart 50mg BID. Normal EF. Cont beta blocker and reduce stimulant intake. \par \par HLD\par Reviewed dietary changes to optimize lipid-lowering therapy.  Evaluate for clinical trial.  Possible addition of PCSK9 inhibitor in the future. At this time LDL at goal <70 on crestor and zetia. \par \par Please do not hesitate to call for any questions or concerns. \par \par Sincerely,\par \par LORRAINE Belle\par Patients history, testing, and plan reviewed with supervising MD: Dr. Shilpa Soria  Bilateral Helical Rim Advancement Flap Text: The defect edges were debeveled with a #15 blade scalpel.  Given the location of the defect and the proximity to free margins (helical rim) a bilateral helical rim advancement flap was deemed most appropriate.  Using a sterile surgical marker, the appropriate advancement flaps were drawn incorporating the defect and placing the expected incisions between the helical rim and antihelix where possible.  The area thus outlined was incised through and through with a #15 scalpel blade.  With a skin hook and iris scissors, the flaps were gently and sharply undermined and freed up.

## 2022-05-13 ENCOUNTER — EMERGENCY (EMERGENCY)
Facility: HOSPITAL | Age: 67
LOS: 1 days | Discharge: HOME | End: 2022-05-13
Admitting: EMERGENCY MEDICINE
Payer: MEDICARE

## 2022-05-13 DIAGNOSIS — I10 ESSENTIAL (PRIMARY) HYPERTENSION: ICD-10-CM

## 2022-05-13 DIAGNOSIS — R42 DIZZINESS AND GIDDINESS: ICD-10-CM

## 2022-05-13 DIAGNOSIS — Z98.890 OTHER SPECIFIED POSTPROCEDURAL STATES: ICD-10-CM

## 2022-05-13 DIAGNOSIS — H90.42 SENSORINEURAL HEARING LOSS, UNILATERAL, LEFT EAR, WITH UNRESTRICTED HEARING ON THE CONTRALATERAL SIDE: ICD-10-CM

## 2022-05-13 PROCEDURE — 93010 ELECTROCARDIOGRAM REPORT: CPT

## 2022-05-13 PROCEDURE — 71045 X-RAY EXAM CHEST 1 VIEW: CPT | Mod: 26

## 2022-05-13 PROCEDURE — 99285 EMERGENCY DEPT VISIT HI MDM: CPT

## 2022-05-13 PROCEDURE — 70450 CT HEAD/BRAIN W/O DYE: CPT | Mod: 26,MG

## 2022-05-13 PROCEDURE — G1004: CPT

## 2022-06-02 ENCOUNTER — OUTPATIENT (OUTPATIENT)
Dept: OUTPATIENT SERVICES | Facility: HOSPITAL | Age: 67
LOS: 1 days | End: 2022-06-02

## 2022-06-02 DIAGNOSIS — I10 ESSENTIAL (PRIMARY) HYPERTENSION: ICD-10-CM

## 2022-06-02 DIAGNOSIS — U07.1 COVID-19: ICD-10-CM

## 2022-06-02 DIAGNOSIS — R50.9 FEVER, UNSPECIFIED: ICD-10-CM

## 2022-06-02 DIAGNOSIS — E78.5 HYPERLIPIDEMIA, UNSPECIFIED: ICD-10-CM

## 2022-07-29 ENCOUNTER — APPOINTMENT (OUTPATIENT)
Dept: CARDIOLOGY | Facility: CLINIC | Age: 67
End: 2022-07-29

## 2022-07-29 VITALS
BODY MASS INDEX: 28.42 KG/M2 | HEART RATE: 71 BPM | SYSTOLIC BLOOD PRESSURE: 146 MMHG | WEIGHT: 203 LBS | TEMPERATURE: 97.8 F | HEIGHT: 71 IN | OXYGEN SATURATION: 98 % | DIASTOLIC BLOOD PRESSURE: 80 MMHG

## 2022-07-29 PROCEDURE — 99214 OFFICE O/P EST MOD 30 MIN: CPT

## 2022-08-17 NOTE — ASSESSMENT
[FreeTextEntry1] : \par \par evaluate candidacy for clinical trials. \par Discussed screening of first-degree relatives.  \par Further cardiovascular testing as guided by symptoms.\par \par Significant family history CAD. \par CAC. Carotid atherosclerosis.\par Suspect familial hyperlipidemia.  +Lpa\par active without symptoms. Normal EF. Rec continued optimal medical therapy. \par Cont ASA/statin/BB. Call for new symptoms for further eval. \par \par HTN\par Educated patient on low salt diet, alcohol intake in moderation, regular cardiovascular exercise, and weight reduction for improved BP control.\par monitor BP at home and call for persistently elevated readings \par \par Mild VHD, normal EF. Surveillance monitoring advised. \par \par Ectopy, rare PVCs on recent monitor reviewed well controlled on metoprolol tart 50mg BID. Normal EF. Cont beta blocker\par \par HLD\par Reviewed dietary changes to optimize lipid-lowering therapy.  Evaluate for clinical trial.  Possible addition of PCSK9 inhibitor in the future. \par \par Please do not hesitate to call for any questions or concerns.

## 2022-08-17 NOTE — HISTORY OF PRESENT ILLNESS
[FreeTextEntry1] : TRISTA CONTEH  is a 67 year old  M\par \par Longstanding history of hypertension and hyperlipidemia.  Family history of cardiovascular disease.  \par Multiple family members with cardiovascular disease in his 60s.  Elevated Lp(a). \par Previously underwent evaluation with Dr. Gary.  \par History of PVCs, valvular heart disease, abnormal stress test.  \par CAC by CT. \par \par There is no prior history of a clinical myocardial infarction, coronary revascularization. \par There is no history of symptomatic congestive heart failure rheumatic heart disease\par There is no history of symptomatic arrhythmias including atrial fibrillation.\par \par Had an episode of hearing loss.  Remains physically active.  Bikes up to 40 minutes.  Average heart rate 130s, peaks in 150s.  Upcoming endoscopy. \par \par Blood work March 2022 creatinine 0.9 total cholesterol 140 LDL 74 \par Holter monitor 9/29/21 normal sinus rhythm with avg 63 bpm. rare APCs and PVCs. \par Carotid US 9/29/21 mild nonobx atherosclerosis BL\par Echocardiogram 2/2021 demonstrates normal left ventricular function, mild MR, mild AI, mild pulmonary hypertension. \par Feb '21 Lp(a) >500\par CTA 7/2/20: No active pulmonary disease. 5 mm granuloma is present in the right middle lobe. Atherosclerotic changes as described above with no hemodynamically significant stenoses identified. The patient's total calcium score is 232 with atherosclerotic calcifications distributed as follows: Left main = 0; LAD = 146; left circumflex = 3; RCA = 83.\par Holter 6/25/20: SR with rates ranging from  bpm. Average HR 69 bpm. PACs were rare with no sequentials noted. PVCs were rare to occasional with no couplets noted. Patient returned a diary with sx's of "occasional PVC" which corresponded to normal sinus with PVCs.\par Nuclear stress test 6/25/20: Aram Protocol 8 minutes and 7 seconds. Frequent PACs and PVCs. Ventricular couplets, runs of NSVT (longest 9 beats in duration; patient was off Metoprolol). EKG was positive for ischemia. There is a small, mild defect in inferior wall that is fixed c/w diaphragmatic attenuation artifact. The observed defects are no longer significant with prone imaging.\par Abd ultasound 6/25/20: Mild heterogeneous atherosclerosis seen throughout abd aorta. There is no evidence of abd aortic aneurysm.\par

## 2023-03-09 ENCOUNTER — RESULT CHARGE (OUTPATIENT)
Age: 68
End: 2023-03-09

## 2023-03-10 ENCOUNTER — NON-APPOINTMENT (OUTPATIENT)
Age: 68
End: 2023-03-10

## 2023-03-10 ENCOUNTER — APPOINTMENT (OUTPATIENT)
Dept: CARDIOLOGY | Facility: CLINIC | Age: 68
End: 2023-03-10
Payer: MEDICARE

## 2023-03-10 VITALS
TEMPERATURE: 97.3 F | BODY MASS INDEX: 29.4 KG/M2 | OXYGEN SATURATION: 99 % | WEIGHT: 210 LBS | HEART RATE: 73 BPM | DIASTOLIC BLOOD PRESSURE: 80 MMHG | HEIGHT: 71 IN | SYSTOLIC BLOOD PRESSURE: 142 MMHG

## 2023-03-10 DIAGNOSIS — R00.0 TACHYCARDIA, UNSPECIFIED: ICD-10-CM

## 2023-03-10 PROCEDURE — 99214 OFFICE O/P EST MOD 30 MIN: CPT

## 2023-03-11 NOTE — HISTORY OF PRESENT ILLNESS
[FreeTextEntry1] : TRISTA CONTEH  is a 67 year old  M\par \par \par Longstanding history of hypertension and hyperlipidemia.  Family history of cardiovascular disease.  \par Multiple family members with cardiovascular disease in his 60s.  Elevated Lp(a). \par Previously underwent evaluation with Dr. Gary.  \par History of PVCs, valvular heart disease, abnormal stress test.  \par CAC by CT. \par \par There is no prior history of a clinical myocardial infarction, coronary revascularization. \par There is no history of symptomatic congestive heart failure rheumatic heart disease\par There is no history of symptomatic arrhythmias including atrial fibrillation.\par \par He was exercising on his elliptical and had a transient rapid heart rate.  \par He was aware of the rapid heart rate. \par He routinely monitors his heart rate when he exercises.  He also uses a [employer] ovidio.  \par \par Blood work September 2022 creatinine 1.0 total cholesterol 143 LDL 75 TSH 2.9 \par March 2023 total cholesterol 151 LDL 74 \par EKG demonstrates normal sinus rhythm\par Holter monitor 9/29/21 normal sinus rhythm with avg 63 bpm. rare APCs and PVCs. \par Carotid US 9/29/21 mild nonobx atherosclerosis BL\par Feb '21 Lp(a) >500\par CTA 7/2/20: No active pulmonary disease. 5 mm granuloma is present in the right middle lobe. Atherosclerotic changes as described above with no hemodynamically significant stenoses identified. The patient's total calcium score is 232 with atherosclerotic calcifications distributed as follows: Left main = 0; LAD = 146; left circumflex = 3; RCA = 83.\par Nuclear stress test 6/25/20: Aram Protocol 8 minutes and 7 seconds. Frequent PACs and PVCs. Ventricular couplets, runs of NSVT (longest 9 beats in duration; patient was off Metoprolol). EKG was positive for ischemia. There is a small, mild defect in inferior wall that is fixed c/w diaphragmatic attenuation artifact. The observed defects are no longer significant with prone imaging.\par Abd ultasound 6/25/20: Mild heterogeneous atherosclerosis seen throughout abd aorta. There is no evidence of abd aortic aneurysm.\par

## 2023-03-11 NOTE — ASSESSMENT
[FreeTextEntry1] : Likely SVT.  \par Monitor has been applied.  \par Rule out atrial fibrillation.  \par Follow-up echocardiogram.  \par Continue aspirin and lipid-lowering therapy.  \par Continue beta-blocker.  \par iscussed indication for anticoagulation of atrial fibrillation. \par Discussed future LPa targeted therapies. \par evaluate candidacy for clinical trials. \par Discussed screening of first-degree relatives.  \par \par Significant family history CAD. \par CAC. Carotid atherosclerosis.\par Suspect familial hyperlipidemia.  +Lpa\par \par HTN\par low salt diet, regular cardiovascular exercise\par monitor BP at home and call for persistently elevated readings \par \par Mild VHD, normal EF. Surveillance monitoring advised. \par \par Ectopy, PVCs metoprolol tart 50mg BID. Cont beta blocker monitor ef\par \par HLD\par Reviewed dietary changes to optimize lipid-lowering therapy.  Evaluate for clinical trial.  Possible addition of PCSK9 inhibitor in the future. \par

## 2023-03-22 ENCOUNTER — APPOINTMENT (OUTPATIENT)
Dept: CARDIOLOGY | Facility: CLINIC | Age: 68
End: 2023-03-22
Payer: MEDICARE

## 2023-03-22 PROCEDURE — 93244 EXT ECG>48HR<7D REV&INTERPJ: CPT

## 2023-03-31 ENCOUNTER — APPOINTMENT (OUTPATIENT)
Dept: CARDIOLOGY | Facility: CLINIC | Age: 68
End: 2023-03-31
Payer: MEDICARE

## 2023-03-31 PROCEDURE — 76376 3D RENDER W/INTRP POSTPROCES: CPT

## 2023-03-31 PROCEDURE — 93880 EXTRACRANIAL BILAT STUDY: CPT

## 2023-03-31 PROCEDURE — 93306 TTE W/DOPPLER COMPLETE: CPT

## 2023-04-05 ENCOUNTER — APPOINTMENT (OUTPATIENT)
Dept: CARDIOLOGY | Facility: CLINIC | Age: 68
End: 2023-04-05
Payer: MEDICARE

## 2023-04-05 VITALS
HEART RATE: 68 BPM | BODY MASS INDEX: 29.4 KG/M2 | HEIGHT: 71 IN | OXYGEN SATURATION: 98 % | WEIGHT: 210 LBS | SYSTOLIC BLOOD PRESSURE: 150 MMHG | DIASTOLIC BLOOD PRESSURE: 84 MMHG

## 2023-04-05 DIAGNOSIS — I51.7 CARDIOMEGALY: ICD-10-CM

## 2023-04-05 DIAGNOSIS — I49.3 VENTRICULAR PREMATURE DEPOLARIZATION: ICD-10-CM

## 2023-04-05 DIAGNOSIS — I65.23 OCCLUSION AND STENOSIS OF BILATERAL CAROTID ARTERIES: ICD-10-CM

## 2023-04-05 PROCEDURE — 99214 OFFICE O/P EST MOD 30 MIN: CPT

## 2023-04-05 NOTE — ADDENDUM
[FreeTextEntry1] : Please note the patient was reviewed with NP Alanis Chacon.\par I was physically present during the service of the patient.\par I was directly involved in the management plan and recommendations of the care provided to the patient. \par I personally reviewed the history and physical examination as documented by the NP above.\par \par

## 2023-04-05 NOTE — ASSESSMENT
[FreeTextEntry1] : TRISTA CONTEH is a 67 year old M who presents today Apr 05, 2023 with the above history and the following active issues: \par \par Significant family history CAD. \par CAC. Carotid atherosclerosis.\par Suspect familial hyperlipidemia.  +Lpa\par Highly active without angina\par Continue aspirin and lipid-lowering therapy.  \par Continue beta-blocker.  \par Discussed future LPa targeted therapies. \par evaluate candidacy for clinical trials. \par Discussed screening of first-degree relatives.  \par \par HTN\par White coat HTN\par Home readings very well controlled\par will monitor LVH \par cont BB \par low salt diet, regular cardiovascular exercise\par monitor BP at home and call for persistently elevated readings \par \par Mild VHD, normal EF. Surveillance monitoring advised. \par \par Ectopy, PVCs\par Low burden on monitor \par Recent event on home monitor likely SVT reports <20 sec duration isolated incident\par No significant dysrhythmia on monitor\par Normal EF\par Asked to call if recurrence, avoid triggers, counter maneuvers reviewed \par Cont metoprolol tart 50mg BID.\par \par F/U with our office in 6 months for routine cardiovascular care unless otherwise indicated. \par Discussed red flag symptoms, which would warrant sooner or emergent medical evaluation.\par Any questions and concerns were addressed and resolved. \par \par Sincerely,\par \par LORRAINE Foy\par Patients history, testing, and plan reviewed with supervising MD: Dr. Valentin Calixto

## 2023-04-05 NOTE — HISTORY OF PRESENT ILLNESS
[FreeTextEntry1] : TRISTA CONTEH  is a 67 year old  M\par \par Longstanding history of hypertension and hyperlipidemia.  Family history of cardiovascular disease.  \par Multiple family members with cardiovascular disease in his 60s.  Elevated Lp(a). \par Previously underwent evaluation with Dr. Gary.  \par History of PVCs, valvular heart disease, abnormal stress test.  \par CAC by CT. \par \par There is no prior history of a clinical myocardial infarction, coronary revascularization. \par There is no history of symptomatic congestive heart failure rheumatic heart disease\par There is no history of symptomatic arrhythmias including atrial fibrillation.\par \par He was exercising on his elliptical and had a transient rapid heart rate.  \par He was aware of the rapid heart rate. \par He routinely monitors his heart rate when he exercises.  He also uses a [employer] ovidio.  \par He monitors home BP and showed me monitor readings - 121/76 and 114/73 in the past week. \par Endorses longstanding white coat HTN. \par \par Monitor 5 days Mar 2023 normal sinus rhythm avg 70 bpm, rare ectopy\par Echo 3/2023 EF 60%, trace to mild MR/AI, mild TR/LA,  mild LVH is new\par Carotid doppler 3/2023 mild to mod nonobx atherosclerosis \par Blood work September 2022 creatinine 1.0 total cholesterol 143 LDL 75 TSH 2.9 \par March 2023 total cholesterol 151 LDL 74 \par EKG demonstrates normal sinus rhythm\par \par Feb '21 Lp(a) >500\par CTA 7/2/20: No active pulmonary disease. 5 mm granuloma is present in the right middle lobe. Atherosclerotic changes as described above with no hemodynamically significant stenoses identified. The patient's total calcium score is 232 with atherosclerotic calcifications\par \par Nuclear stress test 6/25/20: Aram Protocol 8 minutes and 7 seconds. Frequent PACs and PVCs. Ventricular couplets, runs of NSVT (longest 9 beats in duration; patient was off Metoprolol). EKG was positive for ischemia. There is a small, mild defect in inferior wall that is fixed c/w diaphragmatic attenuation artifact. The observed defects are no longer significant with prone imaging.\par \par Abd ultasound 6/25/20: Mild heterogeneous atherosclerosis seen throughout abd aorta. There is no evidence of abd aortic aneurysm.\par

## 2023-08-22 ENCOUNTER — OFFICE (OUTPATIENT)
Dept: URBAN - METROPOLITAN AREA CLINIC 8 | Facility: CLINIC | Age: 68
Setting detail: OPHTHALMOLOGY
End: 2023-08-22
Payer: MEDICARE

## 2023-08-22 DIAGNOSIS — H43.813: ICD-10-CM

## 2023-08-22 PROCEDURE — 92012 INTRM OPH EXAM EST PATIENT: CPT

## 2023-08-22 ASSESSMENT — LID POSITION - DERMATOCHALASIS
OD_DERMATOCHALASIS: RUL 2+
OS_DERMATOCHALASIS: LUL 2+

## 2023-08-22 ASSESSMENT — CONFRONTATIONAL VISUAL FIELD TEST (CVF)
OD_FINDINGS: FULL
OS_FINDINGS: FULL

## 2023-08-24 ASSESSMENT — REFRACTION_MANIFEST
OD_SPHERE: -0.50
OD_VA2: 20/20(J1+)
OS_SPHERE: -3.00
OS_ADD: +2.25
OS_VA1: 20/20
OS_VA2: 20/20(J1+)
OS_CYLINDER: +0.75
OD_VA1: 20/20
OU_VA: 20/20
OD_CYLINDER: SPH
OS_AXIS: 180
OD_ADD: +2.25

## 2023-08-24 ASSESSMENT — KERATOMETRY
OD_K2POWER_DIOPTERS: 46.75
OD_AXISANGLE_DEGREES: 070
OS_K1POWER_DIOPTERS: 45.25
OS_AXISANGLE_DEGREES: 024
OD_K1POWER_DIOPTERS: 46.25
OS_K2POWER_DIOPTERS: 46.25

## 2023-08-24 ASSESSMENT — REFRACTION_AUTOREFRACTION
OS_SPHERE: -3.00
OD_SPHERE: -0.50
OS_CYLINDER: +0.75
OD_AXIS: 057
OS_AXIS: 026
OD_CYLINDER: +0.50

## 2023-08-24 ASSESSMENT — SPHEQUIV_DERIVED
OD_SPHEQUIV: -0.25
OS_SPHEQUIV: -2.625
OS_SPHEQUIV: -2.625

## 2023-08-24 ASSESSMENT — AXIALLENGTH_DERIVED
OS_AL: 23.7898
OS_AL: 23.7898
OD_AL: 22.6284

## 2023-08-24 ASSESSMENT — VISUAL ACUITY
OD_BCVA: 20/200
OS_BCVA: 20/20

## 2023-11-30 ENCOUNTER — NON-APPOINTMENT (OUTPATIENT)
Age: 68
End: 2023-11-30

## 2023-11-30 ENCOUNTER — APPOINTMENT (OUTPATIENT)
Dept: OPHTHALMOLOGY | Facility: CLINIC | Age: 68
End: 2023-11-30
Payer: MEDICARE

## 2023-11-30 PROCEDURE — 99204 OFFICE O/P NEW MOD 45 MIN: CPT

## 2023-12-08 ENCOUNTER — APPOINTMENT (OUTPATIENT)
Dept: CARDIOLOGY | Facility: CLINIC | Age: 68
End: 2023-12-08

## 2023-12-21 ENCOUNTER — APPOINTMENT (OUTPATIENT)
Dept: OPHTHALMOLOGY | Facility: CLINIC | Age: 68
End: 2023-12-21
Payer: MEDICARE

## 2023-12-21 ENCOUNTER — NON-APPOINTMENT (OUTPATIENT)
Age: 68
End: 2023-12-21

## 2023-12-21 PROCEDURE — 92060 SENSORIMOTOR EXAMINATION: CPT

## 2023-12-21 PROCEDURE — 92015 DETERMINE REFRACTIVE STATE: CPT

## 2024-02-27 ENCOUNTER — APPOINTMENT (OUTPATIENT)
Dept: CARDIOLOGY | Facility: CLINIC | Age: 69
End: 2024-02-27
Payer: MEDICARE

## 2024-02-27 DIAGNOSIS — Z71.89 OTHER SPECIFIED COUNSELING: ICD-10-CM

## 2024-02-27 DIAGNOSIS — Z82.49 FAMILY HISTORY OF ISCHEMIC HEART DISEASE AND OTHER DISEASES OF THE CIRCULATORY SYSTEM: ICD-10-CM

## 2024-02-27 DIAGNOSIS — E78.41 ELEVATED LIPOPROTEIN(A): ICD-10-CM

## 2024-02-27 DIAGNOSIS — E78.5 HYPERLIPIDEMIA, UNSPECIFIED: ICD-10-CM

## 2024-02-27 DIAGNOSIS — I25.10 ATHEROSCLEROTIC HEART DISEASE OF NATIVE CORONARY ARTERY W/OUT ANGINA PECTORIS: ICD-10-CM

## 2024-02-27 DIAGNOSIS — I10 ESSENTIAL (PRIMARY) HYPERTENSION: ICD-10-CM

## 2024-02-27 PROCEDURE — 99214 OFFICE O/P EST MOD 30 MIN: CPT

## 2024-02-27 PROCEDURE — 93000 ELECTROCARDIOGRAM COMPLETE: CPT

## 2024-02-29 NOTE — HISTORY OF PRESENT ILLNESS
[FreeTextEntry1] : TRISTA CONTEH  is a 68 year old  M  blood work January 2024 creatinine 1.0 total cholesterol 136 LDL 65 hemoglobin 14.4Overall he reports feeling clinically well.  He is very reluctant to do any ischemic evaluation at this time.  I discussed the importance of ischemic evaluation if development of any new symptoms.  Otherwise surveillance monitoring should be performed due to his history of coronary artery disease.  He reports his last LDL was less than 70.  Outside blood work has been requested from his primary physician.  Today's EKG demonstrates normal sinus rhythm.  Copy EKG has been provided to the patient.  Clinical follow-up will be scheduled in 6 months.  Instructed to notify our office if any new symptoms. Blood work September 2023 creatinine 1.0 potassium 4.2 total cholesterol 149 LDL 69 TSH 3.2 today's EKG demonstrates normal sinus rhythm.  EKG and blood work have been reviewed. Longstanding history of hypertension and hyperlipidemia.  Family history of cardiovascular disease.   Multiple family members with cardiovascular disease in his 60s.  Elevated Lp(a).  Previously underwent evaluation with Dr. Gary.   History of PVCs, valvular heart disease, abnormal stress test.   CAC by CT.   There is no prior history of a clinical myocardial infarction, coronary revascularization.  There is no history of symptomatic congestive heart failure rheumatic heart disease There is no history of symptomatic arrhythmias including atrial fibrillation.  He was exercising on his elliptical and had a transient rapid heart rate.   He was aware of the rapid heart rate.  He routinely monitors his heart rate when he exercises.  He also uses a [email or web address] ovidio.   He monitors home BP and showed me monitor readings - 121/76 and 114/73 in the past week.  Endorses longstanding white coat HTN.   Monitor 5 days Mar 2023 normal sinus rhythm avg 70 bpm, rare ectopy Echo 3/2023 EF 60%, trace to mild MR/AI, mild TR/IN,  mild LVH is new Carotid doppler 3/2023 mild to mod nonobx atherosclerosis  Feb '21 Lp(a) >500 CTA 7/2/20: No active pulmonary disease. 5 mm granuloma is present in the right middle lobe. Atherosclerotic changes as described above with no hemodynamically significant stenoses identified. The patient's total calcium score is 232 with atherosclerotic calcifications Nuclear stress test 6/25/20: Aram Protocol 8 minutes and 7 seconds. Frequent PACs and PVCs. Ventricular couplets, runs of NSVT (longest 9 beats in duration; patient was off Metoprolol). EKG was positive for ischemia. There is a small, mild defect in inferior wall that is fixed c/w diaphragmatic attenuation artifact. The observed defects are no longer significant with prone imaging. Abd ultasound 6/25/20: Mild heterogeneous atherosclerosis seen throughout abd aorta. There is no evidence of abd aortic aneurysm.  Significant family history CAD.  CAC. Carotid atherosclerosis. Suspect familial hyperlipidemia.  +Lpa Highly active without angina Continue aspirin and lipid-lowering therapy.   Continue beta-blocker.   Discussed future LPa targeted therapies.  evaluate candidacy for clinical trials.  Discussed screening of first-degree relatives.    HTN White coat HTN Home readings very well controlled will monitor LVH  cont BB  low salt diet, regular cardiovascular exercise monitor BP at home and call for persistently elevated readings   Mild VHD, normal EF. Surveillance monitoring advised.   Ectopy, PVCs Low burden on monitor  Recent event on home monitor likely SVT reports <20 sec duration isolated incident No significant dysrhythmia on monitor Normal EF Asked to call if recurrence, avoid triggers, counter maneuvers reviewed  Cont metoprolol tart 50mg BID.  Discussed red flag symptoms, which would warrant sooner or emergent medical evaluation. Any questions and concerns were addressed and resolved.

## 2024-11-15 ENCOUNTER — APPOINTMENT (OUTPATIENT)
Dept: CARDIOLOGY | Facility: CLINIC | Age: 69
End: 2024-11-15

## 2025-01-08 ENCOUNTER — NON-APPOINTMENT (OUTPATIENT)
Age: 70
End: 2025-01-08

## 2025-01-08 ENCOUNTER — APPOINTMENT (OUTPATIENT)
Dept: CARDIOLOGY | Facility: CLINIC | Age: 70
End: 2025-01-08
Payer: MEDICARE

## 2025-01-08 VITALS
OXYGEN SATURATION: 97 % | HEIGHT: 71 IN | BODY MASS INDEX: 29.12 KG/M2 | HEART RATE: 75 BPM | SYSTOLIC BLOOD PRESSURE: 148 MMHG | DIASTOLIC BLOOD PRESSURE: 80 MMHG | WEIGHT: 208 LBS

## 2025-01-08 DIAGNOSIS — E78.5 HYPERLIPIDEMIA, UNSPECIFIED: ICD-10-CM

## 2025-01-08 DIAGNOSIS — Z71.89 OTHER SPECIFIED COUNSELING: ICD-10-CM

## 2025-01-08 DIAGNOSIS — E78.41 ELEVATED LIPOPROTEIN(A): ICD-10-CM

## 2025-01-08 DIAGNOSIS — I25.10 ATHEROSCLEROTIC HEART DISEASE OF NATIVE CORONARY ARTERY W/OUT ANGINA PECTORIS: ICD-10-CM

## 2025-01-08 DIAGNOSIS — I10 ESSENTIAL (PRIMARY) HYPERTENSION: ICD-10-CM

## 2025-01-08 PROCEDURE — 93000 ELECTROCARDIOGRAM COMPLETE: CPT

## 2025-01-08 PROCEDURE — 99214 OFFICE O/P EST MOD 30 MIN: CPT

## 2025-01-20 ENCOUNTER — APPOINTMENT (OUTPATIENT)
Dept: OPHTHALMOLOGY | Facility: CLINIC | Age: 70
End: 2025-01-20
Payer: MEDICARE

## 2025-01-20 ENCOUNTER — NON-APPOINTMENT (OUTPATIENT)
Age: 70
End: 2025-01-20

## 2025-01-20 PROCEDURE — 92014 COMPRE OPH EXAM EST PT 1/>: CPT

## 2025-01-22 ENCOUNTER — APPOINTMENT (OUTPATIENT)
Dept: CARDIOLOGY | Facility: CLINIC | Age: 70
End: 2025-01-22

## 2025-01-28 ENCOUNTER — APPOINTMENT (OUTPATIENT)
Dept: CARDIOLOGY | Facility: CLINIC | Age: 70
End: 2025-01-28

## 2025-02-05 ENCOUNTER — APPOINTMENT (OUTPATIENT)
Dept: CARDIOLOGY | Facility: CLINIC | Age: 70
End: 2025-02-05
Payer: MEDICARE

## 2025-02-05 PROCEDURE — A9502: CPT | Mod: JZ

## 2025-02-05 PROCEDURE — 93242 EXT ECG>48HR<7D RECORDING: CPT

## 2025-02-05 PROCEDURE — 78452 HT MUSCLE IMAGE SPECT MULT: CPT

## 2025-02-05 PROCEDURE — 93015 CV STRESS TEST SUPVJ I&R: CPT

## 2025-02-06 ENCOUNTER — NON-APPOINTMENT (OUTPATIENT)
Age: 70
End: 2025-02-06

## 2025-02-06 ENCOUNTER — APPOINTMENT (OUTPATIENT)
Dept: OPHTHALMOLOGY | Facility: CLINIC | Age: 70
End: 2025-02-06
Payer: MEDICARE

## 2025-02-06 PROCEDURE — 66821 AFTER CATARACT LASER SURGERY: CPT | Mod: RT

## 2025-02-12 ENCOUNTER — APPOINTMENT (OUTPATIENT)
Dept: CARDIOLOGY | Facility: CLINIC | Age: 70
End: 2025-02-12
Payer: MEDICARE

## 2025-02-12 VITALS
DIASTOLIC BLOOD PRESSURE: 78 MMHG | WEIGHT: 209 LBS | SYSTOLIC BLOOD PRESSURE: 138 MMHG | HEART RATE: 67 BPM | BODY MASS INDEX: 29.26 KG/M2 | HEIGHT: 71 IN | OXYGEN SATURATION: 100 %

## 2025-02-12 DIAGNOSIS — I49.3 VENTRICULAR PREMATURE DEPOLARIZATION: ICD-10-CM

## 2025-02-12 DIAGNOSIS — I51.7 CARDIOMEGALY: ICD-10-CM

## 2025-02-12 DIAGNOSIS — Z71.89 OTHER SPECIFIED COUNSELING: ICD-10-CM

## 2025-02-12 DIAGNOSIS — I25.10 ATHEROSCLEROTIC HEART DISEASE OF NATIVE CORONARY ARTERY W/OUT ANGINA PECTORIS: ICD-10-CM

## 2025-02-12 DIAGNOSIS — I10 ESSENTIAL (PRIMARY) HYPERTENSION: ICD-10-CM

## 2025-02-12 DIAGNOSIS — I65.23 OCCLUSION AND STENOSIS OF BILATERAL CAROTID ARTERIES: ICD-10-CM

## 2025-02-12 DIAGNOSIS — Z82.49 FAMILY HISTORY OF ISCHEMIC HEART DISEASE AND OTHER DISEASES OF THE CIRCULATORY SYSTEM: ICD-10-CM

## 2025-02-12 DIAGNOSIS — E78.41 ELEVATED LIPOPROTEIN(A): ICD-10-CM

## 2025-02-12 DIAGNOSIS — E78.5 HYPERLIPIDEMIA, UNSPECIFIED: ICD-10-CM

## 2025-02-12 DIAGNOSIS — I47.29 OTHER VENTRICULAR TACHYCARDIA: ICD-10-CM

## 2025-02-12 PROCEDURE — 99214 OFFICE O/P EST MOD 30 MIN: CPT

## 2025-02-18 PROCEDURE — 93244 EXT ECG>48HR<7D REV&INTERPJ: CPT

## 2025-03-06 ENCOUNTER — NON-APPOINTMENT (OUTPATIENT)
Age: 70
End: 2025-03-06

## 2025-03-06 ENCOUNTER — APPOINTMENT (OUTPATIENT)
Dept: OPHTHALMOLOGY | Facility: CLINIC | Age: 70
End: 2025-03-06
Payer: MEDICARE

## 2025-03-06 PROCEDURE — 99024 POSTOP FOLLOW-UP VISIT: CPT

## 2025-04-12 ENCOUNTER — RX RENEWAL (OUTPATIENT)
Age: 70
End: 2025-04-12

## 2025-08-12 ENCOUNTER — APPOINTMENT (OUTPATIENT)
Dept: CARDIOLOGY | Facility: CLINIC | Age: 70
End: 2025-08-12

## 2025-09-09 ENCOUNTER — APPOINTMENT (OUTPATIENT)
Dept: CARDIOLOGY | Facility: CLINIC | Age: 70
End: 2025-09-09
Payer: MEDICARE

## 2025-09-09 VITALS
BODY MASS INDEX: 28 KG/M2 | WEIGHT: 200 LBS | HEART RATE: 75 BPM | SYSTOLIC BLOOD PRESSURE: 170 MMHG | HEIGHT: 71 IN | DIASTOLIC BLOOD PRESSURE: 100 MMHG | OXYGEN SATURATION: 99 %

## 2025-09-09 DIAGNOSIS — Z82.49 FAMILY HISTORY OF ISCHEMIC HEART DISEASE AND OTHER DISEASES OF THE CIRCULATORY SYSTEM: ICD-10-CM

## 2025-09-09 DIAGNOSIS — I25.10 ATHEROSCLEROTIC HEART DISEASE OF NATIVE CORONARY ARTERY W/OUT ANGINA PECTORIS: ICD-10-CM

## 2025-09-09 DIAGNOSIS — I10 ESSENTIAL (PRIMARY) HYPERTENSION: ICD-10-CM

## 2025-09-09 DIAGNOSIS — Z71.89 OTHER SPECIFIED COUNSELING: ICD-10-CM

## 2025-09-09 DIAGNOSIS — E78.5 HYPERLIPIDEMIA, UNSPECIFIED: ICD-10-CM

## 2025-09-09 DIAGNOSIS — I49.3 VENTRICULAR PREMATURE DEPOLARIZATION: ICD-10-CM

## 2025-09-09 PROCEDURE — 99214 OFFICE O/P EST MOD 30 MIN: CPT

## 2025-09-09 RX ORDER — CYANOCOBALAMIN, ISOPROPYL ALCOHOL 1000MCG/ML
KIT INJECTION
Refills: 0 | Status: ACTIVE | COMMUNITY

## 2025-09-15 ENCOUNTER — NON-APPOINTMENT (OUTPATIENT)
Age: 70
End: 2025-09-15

## 2025-09-15 ENCOUNTER — APPOINTMENT (OUTPATIENT)
Dept: OPHTHALMOLOGY | Facility: CLINIC | Age: 70
End: 2025-09-15
Payer: MEDICARE

## 2025-09-15 PROCEDURE — 92014 COMPRE OPH EXAM EST PT 1/>: CPT
